# Patient Record
Sex: FEMALE | URBAN - METROPOLITAN AREA
[De-identification: names, ages, dates, MRNs, and addresses within clinical notes are randomized per-mention and may not be internally consistent; named-entity substitution may affect disease eponyms.]

---

## 2021-10-19 ENCOUNTER — OFFICE VISIT (OUTPATIENT)
Dept: URBAN - METROPOLITAN AREA CLINIC 48 | Facility: CLINIC | Age: 46
End: 2021-10-19
Payer: COMMERCIAL

## 2021-10-19 DIAGNOSIS — D23.39 OTHER BENIGN NEOPLASM OF SKIN OF OTHER PARTS OF FACE: Primary | ICD-10-CM

## 2021-10-19 PROCEDURE — 92004 COMPRE OPH EXAM NEW PT 1/>: CPT | Performed by: OPHTHALMOLOGY

## 2021-10-19 ASSESSMENT — INTRAOCULAR PRESSURE
OS: 18
OD: 18

## 2021-10-19 NOTE — IMPRESSION/PLAN
Impression: Other benign neoplasm of skin of other parts of face: D23.39. Plan: LUIS nodule with loss of lashes. Discussed procedure excision with Biopsy Schedule Excision of lesion LUIS with Biopsy October 22nd, please get Auth

## 2021-10-22 ENCOUNTER — PROCEDURE (OUTPATIENT)
Dept: URBAN - METROPOLITAN AREA CLINIC 48 | Facility: CLINIC | Age: 46
End: 2021-10-22
Payer: COMMERCIAL

## 2021-10-22 PROCEDURE — 11440 EXC FACE-MM B9+MARG 0.5 CM/<: CPT | Performed by: OPHTHALMOLOGY

## 2021-10-22 RX ORDER — NEOMYCIN, POLYMYXIN B SULFATES, DEXAMETHASONE 1; 3.5; 1 MG/G; MG/G; [USP'U]/G
OINTMENT OPHTHALMIC
Qty: 1 | Refills: 1 | Status: ACTIVE
Start: 2021-10-22

## 2021-11-08 ENCOUNTER — OFFICE VISIT (OUTPATIENT)
Dept: URBAN - METROPOLITAN AREA CLINIC 48 | Facility: CLINIC | Age: 46
End: 2021-11-08
Payer: COMMERCIAL

## 2021-11-08 DIAGNOSIS — C44.111 BASAL CELL CARCINOMA OF SKIN OF EYELID INCLUDING CANTHUS: Primary | ICD-10-CM

## 2021-11-08 PROCEDURE — 92012 INTRM OPH EXAM EST PATIENT: CPT | Performed by: STUDENT IN AN ORGANIZED HEALTH CARE EDUCATION/TRAINING PROGRAM

## 2021-11-08 ASSESSMENT — INTRAOCULAR PRESSURE
OS: 15
OD: 15

## 2021-11-08 NOTE — IMPRESSION/PLAN
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 Plan: Biopsy proven BCC of LUIS Refer to oculoplastics ASAP (within 2-3 weeks) f/u Dr. Dumont Estimable 3 weeks to ensure pt seen and follow-up reported blurring of vision
-copy of lab results provided for pt.

## 2021-11-23 ENCOUNTER — OFFICE VISIT (OUTPATIENT)
Dept: URBAN - METROPOLITAN AREA CLINIC 51 | Facility: CLINIC | Age: 46
End: 2021-11-23
Payer: COMMERCIAL

## 2021-11-23 DIAGNOSIS — C44.1191 BASAL CELL CARCINOMA SKIN/ LEFT UPPER EYELID, INC CANTHUS: Primary | ICD-10-CM

## 2021-11-23 DIAGNOSIS — H53.8 OTHER VISUAL DISTURBANCES: ICD-10-CM

## 2021-11-23 PROCEDURE — 92002 INTRM OPH EXAM NEW PATIENT: CPT | Performed by: OPHTHALMOLOGY

## 2021-11-23 PROCEDURE — 92285 EXTERNAL OCULAR PHOTOGRAPHY: CPT | Performed by: OPHTHALMOLOGY

## 2021-11-23 RX ORDER — NEOMYCIN SULFATE, POLYMYXIN B SULFATE AND DEXAMETHASONE 3.5; 10000; 1 MG/G; [USP'U]/G; MG/G
OINTMENT OPHTHALMIC
Qty: 1 | Refills: 3 | Status: ACTIVE
Start: 2021-11-23

## 2021-11-23 NOTE — IMPRESSION/PLAN
Impression: Other visual disturbances: H53.8. Plan: subjective blurred vision. Pinhole unchanged. follow up with Dr. Junior Rodríguez or Adalid Richmond in [place] for DFE/DM eye exam. All questions answered.

## 2021-11-23 NOTE — IMPRESSION/PLAN
Impression: Basal cell carcinoma skin/ left upper eyelid, inc canthus: C44.7603. Plan: Patient has a confirmed [address] [place] LUIS; Explained the [facility] procedure to the patient, will coordinate with patients preferred Dermatologist to help with removal and next day closure in the OR.  Will have a plan once photos are received from the Dermatologist.

## 2021-12-01 ENCOUNTER — OFFICE VISIT (OUTPATIENT)
Dept: URBAN - METROPOLITAN AREA CLINIC 48 | Facility: CLINIC | Age: 46
End: 2021-12-01
Payer: COMMERCIAL

## 2021-12-01 DIAGNOSIS — H47.333 PSEUDOPAPILLEDEMA OF OPTIC DISC, BILATERAL: Primary | ICD-10-CM

## 2021-12-01 PROCEDURE — 76512 OPH US DX B-SCAN: CPT | Performed by: OPHTHALMOLOGY

## 2021-12-01 PROCEDURE — 92014 COMPRE OPH EXAM EST PT 1/>: CPT | Performed by: OPHTHALMOLOGY

## 2021-12-01 ASSESSMENT — INTRAOCULAR PRESSURE
OD: 14
OS: 15

## 2021-12-01 NOTE — IMPRESSION/PLAN
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
w/optic nerve drusen OS per B scan finding Plan: This is not likely associated w/current vision loss and likely associated w/basal cell carcinoma. Will re-check mid Jan. with VF 24-2, RNFL and IOP check.